# Patient Record
Sex: FEMALE | Race: OTHER | HISPANIC OR LATINO | Employment: FULL TIME | URBAN - METROPOLITAN AREA
[De-identification: names, ages, dates, MRNs, and addresses within clinical notes are randomized per-mention and may not be internally consistent; named-entity substitution may affect disease eponyms.]

---

## 2018-07-12 ENCOUNTER — HOSPITAL ENCOUNTER (EMERGENCY)
Facility: HOSPITAL | Age: 18
Discharge: HOME | End: 2018-07-12
Attending: EMERGENCY MEDICINE

## 2018-07-12 VITALS
HEART RATE: 72 BPM | WEIGHT: 99 LBS | SYSTOLIC BLOOD PRESSURE: 114 MMHG | DIASTOLIC BLOOD PRESSURE: 63 MMHG | OXYGEN SATURATION: 99 % | RESPIRATION RATE: 18 BRPM | TEMPERATURE: 98.3 F

## 2018-07-12 DIAGNOSIS — R55 SYNCOPE: Primary | ICD-10-CM

## 2018-07-12 LAB
ANION GAP BLD CALC-SCNC: 14 MMOL/L (ref 4–13)
ATRIAL RATE: 86 BPM
BACTERIA UR QL AUTO: ABNORMAL /HPF
BILIRUB UR QL STRIP: ABNORMAL
BUN BLD-MCNC: 14 MG/DL (ref 5–25)
CA-I BLD-SCNC: 1.19 MMOL/L (ref 1.12–1.32)
CHLORIDE BLD-SCNC: 103 MMOL/L (ref 100–108)
CLARITY UR: ABNORMAL
COARSE GRAN CASTS URNS QL MICRO: ABNORMAL /LPF
COLOR UR: YELLOW
CREAT BLD-MCNC: 0.5 MG/DL (ref 0.6–1.3)
EXT PREG TEST URINE: NORMAL
GFR SERPL CREATININE-BSD FRML MDRD: 140 ML/MIN/1.73SQ M
GLUCOSE SERPL-MCNC: 78 MG/DL (ref 65–140)
GLUCOSE SERPL-MCNC: 85 MG/DL (ref 65–140)
GLUCOSE UR STRIP-MCNC: NEGATIVE MG/DL
HCT VFR BLD CALC: 38 % (ref 34.8–46.1)
HGB BLDA-MCNC: 12.9 G/DL (ref 11.5–15.4)
HGB UR QL STRIP.AUTO: NEGATIVE
KETONES UR STRIP-MCNC: NEGATIVE MG/DL
LEUKOCYTE ESTERASE UR QL STRIP: NEGATIVE
MUCOUS THREADS UR QL AUTO: ABNORMAL
NITRITE UR QL STRIP: NEGATIVE
NON-SQ EPI CELLS URNS QL MICRO: ABNORMAL /HPF
P AXIS: 55 DEGREES
PCO2 BLD: 27 MMOL/L (ref 21–32)
PH UR STRIP.AUTO: 5.5 [PH] (ref 4.5–8)
POTASSIUM BLD-SCNC: 3.8 MMOL/L (ref 3.5–5.3)
PR INTERVAL: 146 MS
PROT UR STRIP-MCNC: >=300 MG/DL
QRS AXIS: 81 DEGREES
QRSD INTERVAL: 86 MS
QT INTERVAL: 342 MS
QTC INTERVAL: 409 MS
RBC #/AREA URNS AUTO: ABNORMAL /HPF
SODIUM BLD-SCNC: 140 MMOL/L (ref 136–145)
SP GR UR STRIP.AUTO: >=1.03 (ref 1–1.03)
SPECIMEN SOURCE: ABNORMAL
T WAVE AXIS: 53 DEGREES
UROBILINOGEN UR QL STRIP.AUTO: 1 E.U./DL
VENTRICULAR RATE: 86 BPM
WBC #/AREA URNS AUTO: ABNORMAL /HPF

## 2018-07-12 PROCEDURE — 93005 ELECTROCARDIOGRAM TRACING: CPT

## 2018-07-12 PROCEDURE — 81001 URINALYSIS AUTO W/SCOPE: CPT

## 2018-07-12 PROCEDURE — 99284 EMERGENCY DEPT VISIT MOD MDM: CPT

## 2018-07-12 PROCEDURE — 81025 URINE PREGNANCY TEST: CPT | Performed by: EMERGENCY MEDICINE

## 2018-07-12 PROCEDURE — 85014 HEMATOCRIT: CPT

## 2018-07-12 PROCEDURE — 93010 ELECTROCARDIOGRAM REPORT: CPT | Performed by: INTERNAL MEDICINE

## 2018-07-12 PROCEDURE — 80047 BASIC METABLC PNL IONIZED CA: CPT

## 2018-07-12 PROCEDURE — 82948 REAGENT STRIP/BLOOD GLUCOSE: CPT

## 2018-07-12 NOTE — DISCHARGE INSTRUCTIONS
Síncope   CUIDADO AMBULATORIO:   Síncope  también se conoce amanda desmayo o pérdida de la consciencia  El síncope es la pérdida repentina y temporal de la consciencia, seguida por melba caída estando en melba posición de pie o sentado  El síncope puede no tener gravedad o ser melba señal de melba afección más grave que debe tratarse  Usted puede controlar ciertas afecciones médicas que provocan síncope  Kinsey médicos pueden ayudarlo a crear un plan para controlar el síncope y evitar la ocurrencia de episodios  Los signos y síntomas que podrían ocurrir antes de un síncope [place] siguientes:   · Piel fría, húmeda y sudorosa     · Respiración acelerada y un ritmo cardíaco acelerado, peng     · Sentir más cansancio de lo usual     · Náuseas, melba sensación de calor y sudoración    · Dolor de [place] o sensación de desvanecimiento o mareos    · Sensación de hormigueo o adormecimiento     · Manchas julia de los ojos, visión borrosa o visión doble  Busque atención médica de inmediato si:   · Está sangrando porque se golpeó la edward cuando se desmayó  · Usted repentinamente tiene doble visión, dificultad para hablar, adormecimiento y no puede  kinsey brazos o piernas  · Usted tiene dolor en el pecho y dificultad para respirar  · Usted vomita lázaro o un material que parece café molido  · Usted ve lázaro en kinsey deposiciones  Pregúntele a duran Kathleen Dust vitaminas y minerales son adecuados para usted  · Usted tiene nuevos síntomas o kinsey síntomas empeoran  · Tiene otro episodio de síncope  · Tiene dolor de edward, ritmo cardíaco acelerado o se siente demasiado mareado para ponerse pie  · Usted tiene preguntas o inquietudes acerca de duran condición o cuidado  El tratamiento para el síncope  depende de la causa del síncope   Para prevenir que el síncope suceda otra vez, es probable  que usted necesite cualquiera de los siguientes:  · Medicamentos,  podrían necesitarse para tratar cualquier trastorno [employer] que le esté causando el síncope  Estos medicamentos podrían incluir medicamentos para ayudarle a duarn corazón a bombear más peng y regularmente  Duran médico podría hacer cambios a cualquier [facility] esté causando síncope  · El entrenamiento de inclinación  requiere que usted se entrene para ponerse de pie jefferson 10 a 30 minutos contra melba pared  [place] ayuda a que duran cuerpo [employer] de los cambios de [place] y reduce el número de [place]  Control del síncope:   · Lleve un registro de los episodios de síncope  Incluya los síntomas y la actividad que realiza antes y después del episodio  El registro puede ayudar a duran médico a determinar la causa del síncope y ayudarlo a usted a controlar los episodios  · Siéntese o acuéstese cuando sea necesario  [place] incluye cuando se sienta mareado, duran garganta se cierre o note cambios en duran visión  Eleve kinsey piernas por encima del nivel de duran corazón  · Inhale lenta y profundamente si comienza a respirar más rápido a causa de la ansiedad o el temor  [place] puede disminuir el [employer] y la sensación de que se va a desmayar  · Revise duran presión arterial con frecuencia  [place] es importante si usted regina medicamentos para bajar la presión arterial  Revise duran presión arterial cuando esté acostado y cuando esté de pie  Pregunte con que frecuencia debe tomarse la presión jefferson el día  Mantenga un registro de los valores numéricos de duran presión arterial  Duran médico puede usar la información del registro amanda melba base para planificar duran tratamiento  Prevención de un episodio de síncope:   · Muévase lentamente y acostúmbrese a melba posición antes de moverse a [place]  [place] es muy importante cuando usted se cambie de melba posición [place] o sentada a melba posición de pie  Respire profundo varias veces antes de ponerse de pie después de alfa [employer]  Póngase de pie lentamente  Los movimientos repentinos podrían causar [place]   [place]éntese en el borde de la cama o del sofá por unos minutos antes de ponerse de pie  En madeline que esté guardando cama, debe tratar de estar en melba posición vertical por lo menos por 2 horas todos los días o amanda se lo indicaron  No inmovilice las piernas cuando esté de pie jefferson un hcase periodo de [place]  Mueva las piernas y [address] las rodillas para mantener el flujo de Attila Chakraborty  · [id number] [facility],  duran médico   El médico puede  recomendarle que ingiera más líquidos para evitar la deshidratación  Es probable que usted también deba aumentar duran consumo de sal para evitar que duran presión arterial baje demasiado y [place] un síncope  El médico le dirá cuánto líquido y sodio debe consumir cada día  · Esté atento a los signos de bajo nivel de azúcar en la lázaro  Los signos incluyen [place], nerviosismo, sudoración y latidos cardíacos rápidos o palpitantes  Consulte con duran médico sobre métodos para mantener estable duran nivel de azúcar en la lázaro  · No se esfuerce si está estreñido  Puede desmayarse si usted hace fuerza para realizar melba evacuación intestinal  Caminar es lo mejor que usted puede hacer para que kinsey intestinos se [place]  Consuma alimentos ricos en fibra para facilitar las evacuaciones intestinales  Los [employer], los frijoles, las verduras y los panes integrales son Srini Colleen  El jugo de ciruelas pasas también puede suavizar las evacuaciones intestinales  · Tenga cuidado cuando hace calor  El calor puede causar un episodio de síncope  Limite la actividad que realiza al aire byron en días calurosos  La actividad física en días calurosos puede conducir a la deshidratación  [place] puede provocar un episodio  Acuda a kinsey consultas de control con duran médico según le indicaron  Anote kinsey preguntas para que se acuerde de hacerlas jefferson kinsey visitas     © 2017 [address] Information is for End User's use only and may not be sold, redistributed or otherwise used for commercial purposes  All illustrations and images included in CareNotes® are the copyrighted property of A D A M , Inc  or Louie Morales  Esta información es sólo para uso en educación  Duran intención no es darle un consejo médico sobre enfermedades o tratamientos  Colsulte con duran [employer] farmacéutico antes de seguir cualquier régimen médico para saber si es seguro y efectivo para usted

## 2018-07-12 NOTE — ED PROVIDER NOTES
History  Chief Complaint   Patient presents with    Syncope     Patient reports felt syncopal at work, attempted candy for hx  of hypogycemia  Patient reports candy did not help, patient felt nausous vomited x1 and passed out, unknown if she hit her head  Denies possibility of pregnancy, reports first day on job in [address] [place] [address], reports she did not eat breakfast        History provided by:  Patient   used: No    Syncope   Episode history:  Single  Most recent episode: Today  Timing:  Constant  Progression:  Resolved  Chronicity:  Recurrent  Witnessed: yes    Relieved by:  Eating  Worsened by:  Nothing  Ineffective treatments:  None tried  Associated symptoms: no chest pain, no confusion, no dizziness, no fever, no headaches, no nausea, no shortness of breath and no vomiting        None       Past Medical History:   Diagnosis Date    Hypoglycemia        History reviewed  No pertinent surgical history  History reviewed  No pertinent family history  I have reviewed and agree with the history as documented  Social History   Substance Use Topics    Smoking status: Never Smoker    Smokeless tobacco: Never Used    Alcohol use Yes      Comment: socially        Review of Systems   Constitutional: Negative for activity change, appetite change, fatigue, fever and unexpected weight change  HENT: Negative for rhinorrhea, sore throat and voice change  Eyes: Negative for pain and visual disturbance  Respiratory: Negative for cough, chest tightness and shortness of breath  Cardiovascular: Positive for syncope  Negative for chest pain  Gastrointestinal: Negative for abdominal pain, diarrhea, nausea and vomiting  Endocrine: Negative for polyphagia and polyuria  Genitourinary: Negative for difficulty urinating, dysuria, flank pain, frequency, menstrual problem, urgency, vaginal bleeding and vaginal discharge     Musculoskeletal: Negative for back pain, gait problem, neck pain and neck stiffness  Skin: Negative for color change and rash  Allergic/Immunologic: Negative for immunocompromised state  Neurological: Negative for dizziness, syncope, speech difficulty, light-headedness, numbness and headaches  Hematological: Does not bruise/bleed easily  Psychiatric/Behavioral: Negative for confusion and decreased concentration  Physical Exam  Physical Exam   Constitutional: She is oriented to person, place, and time  She appears well-developed and well-nourished  HENT:   Head: Normocephalic and atraumatic  Eyes: Conjunctivae and EOM are normal  Pupils are equal, round, and reactive to light  No scleral icterus  Neck: Normal range of motion  Neck supple  No tracheal deviation present  Cardiovascular: Normal rate and regular rhythm  Pulmonary/Chest: Effort normal and breath sounds normal  No respiratory distress  Abdominal: Soft  She exhibits no distension  There is no tenderness  There is no rebound and no guarding  Musculoskeletal: Normal range of motion  She exhibits no tenderness or deformity  Lymphadenopathy:     She has no cervical adenopathy  Neurological: She is alert and oriented to person, place, and time  No gross focal sensory or motor deficits   Skin: Skin is warm and dry  No rash noted  She is not diaphoretic  Psychiatric: She has a normal mood and affect  Vitals reviewed        Vital Signs  ED Triage Vitals [07/12/18 1059]   Temperature Pulse Respirations Blood Pressure SpO2   98 3 °F (36 8 °C) 88 16 108/73 100 %      Temp Source Heart Rate Source Patient Position - Orthostatic VS BP Location FiO2 (%)   Oral Monitor Sitting Right arm --      Pain Score       No Pain           Vitals:    07/12/18 1059 07/12/18 1256   BP: 108/73 114/63   Pulse: 88 72   Patient Position - Orthostatic VS: Sitting Lying       Visual Acuity      ED Medications  Medications - No data to display    Diagnostic Studies  Results Reviewed     Procedure Component Value Units Date/Time    POCT Chem 8+ [[id number]]  (Abnormal) Collected:  07/12/18 1209    Lab Status:  Final result Updated:  07/12/18 1213     SODIUM, I-STAT 140 mmol/l      Potassium, i-STAT 3 8 mmol/L      Chloride, istat 103 mmol/L      CO2, i-STAT 27 mmol/L      Anion Gap, Istat 14 (H) mmol/L      Calcium, Ionized i-STAT 1 19 mmol/L      BUN, I-STAT 14 mg/dl      Creatinine, i-STAT 0 5 (L) mg/dl      eGFR 140 ml/min/1 73sq m      Glucose, i-STAT 85 mg/dl      Hct, i-STAT 38 %      Hgb, i-STAT 12 9 g/dl      Specimen Type VENOUS    Urine Microscopic [[id number]]  (Abnormal) Collected:  07/12/18 1118    Lab Status:  Final result Specimen:  Urine from Urine, Clean Catch Updated:  07/12/18 1135     RBC, UA 2-4 (A) /hpf      WBC, UA 4-10 (A) /hpf      Epithelial Cells Innumerable (A) /hpf      Bacteria, UA Innumerable (A) /hpf      COARSE GRANULAR CASTS 5-10 /lpf      MUCOUS THREADS Moderate (A)    POCT urinalysis dipstick [[id number]]  (Abnormal) Resulted:  07/12/18 1114    Lab Status:  Final result Updated:  07/12/18 1114    POCT pregnancy, urine [[id number]]  (Normal) Resulted:  07/12/18 1114    Lab Status:  Final result Updated:  07/12/18 1114     EXT PREG TEST UR (Ref: Negative) HCG = neg (-)    ED Urine Macroscopic [[id number]]  (Abnormal) Collected:  07/12/18 1118    Lab Status:  Final result Specimen:  Urine Updated:  07/12/18 1113     Color, UA Yellow     Clarity, UA Cloudy     pH, UA 5 5     Leukocytes, UA Negative     Nitrite, UA Negative     Protein, UA >=300 (A) mg/dl      Glucose, UA Negative mg/dl      Ketones, UA Negative mg/dl      Urobilinogen, UA 1 0 E U /dl      Bilirubin, UA Interference- unable to analyze (A)     Blood, UA Negative     Specific Gravity, UA >=1 030    Narrative:       CLINITEK RESULT    Fingerstick Glucose (POCT) [[id number]]  (Normal) Collected:  07/12/18 1100    Lab Status:  Final result Updated:  07/12/18 1100     POC Glucose 78 mg/dl                  No orders to display Procedures  Procedures       Phone Contacts  ED Phone Contact    ED Course  ED Course as of Jul 12 1414   Thu Jul 12, 2018   1240 Reviewed Patient's EKG with Dr Romy Melendez of cardiology  We agree that it is not suggestive of WPW, Brugada, or HOCM  Suggests OP follow up with PCP/cardiology and possible OP ECHO  MDM  CritCare Time    Disposition  Final diagnoses:   Syncope     Time reflects when diagnosis was documented in both MDM as applicable and the Disposition within this note     Time User Action Codes Description Comment    7/12/2018 12:43 PM Anastasia De Los Santos Add [R55] Syncope       ED Disposition     ED Disposition Condition Comment    Discharge  [facility] discharge to home/self care  Condition at discharge: Good        Follow-up Information     Follow up With Specialties Details [facility]  Please call to arrange for follow-up with outpatient Cardiology  [address] [address] [phone]          There are no discharge medications for this patient  No discharge procedures on file      ED Provider  Electronically Signed by

## 2018-08-14 NOTE — ED PROVIDER NOTES
History  Chief Complaint   Patient presents with    Syncope     Patient reports felt syncopal at work, attempted candy for hx  of hypogycemia  Patient reports candy did not help, patient felt nausous vomited x1 and passed out, unknown if she hit her head  Denies possibility of pregnancy, reports first day on job in [address] [place] [address], reports she did not eat breakfast        History provided by:  Patient   used: No    Syncope   Episode history:  Single  Most recent episode: Today  Timing:  Constant  Progression:  Resolved  Chronicity:  Recurrent  Witnessed: yes    Relieved by:  Eating  Worsened by:  Nothing  Ineffective treatments:  None tried  Associated symptoms: no chest pain, no confusion, no dizziness, no fever, no headaches, no nausea, no shortness of breath and no vomiting        None       Past Medical History:   Diagnosis Date    Hypoglycemia        History reviewed  No pertinent surgical history  History reviewed  No pertinent family history  I have reviewed and agree with the history as documented  Social History   Substance Use Topics    Smoking status: Never Smoker    Smokeless tobacco: Never Used    Alcohol use Yes      Comment: socially        Review of Systems   Constitutional: Negative for activity change, appetite change, fatigue, fever and unexpected weight change  HENT: Negative for rhinorrhea, sore throat and voice change  Eyes: Negative for pain and visual disturbance  Respiratory: Negative for cough, chest tightness and shortness of breath  Cardiovascular: Positive for syncope  Negative for chest pain  Gastrointestinal: Negative for abdominal pain, diarrhea, nausea and vomiting  Endocrine: Negative for polyphagia and polyuria  Genitourinary: Negative for difficulty urinating, dysuria, flank pain, frequency, urgency, vaginal bleeding and vaginal discharge  Musculoskeletal: Negative for back pain, gait problem, neck pain and neck stiffness     Skin: Negative for color change and rash  Allergic/Immunologic: Negative for immunocompromised state  Neurological: Negative for dizziness, syncope, speech difficulty, light-headedness, numbness and headaches  Hematological: Does not bruise/bleed easily  Psychiatric/Behavioral: Negative for confusion and decreased concentration  Physical Exam  Physical Exam   Constitutional: She is oriented to person, place, and time  She appears well-developed and well-nourished  HENT:   Head: Normocephalic and atraumatic  Eyes: Conjunctivae and EOM are normal  Pupils are equal, round, and reactive to light  No scleral icterus  Neck: Normal range of motion  Neck supple  No tracheal deviation present  Cardiovascular: Normal rate and regular rhythm  Pulmonary/Chest: Effort normal and breath sounds normal  No respiratory distress  Abdominal: Soft  She exhibits no distension  There is no tenderness  Musculoskeletal: Normal range of motion  She exhibits no tenderness or deformity  Lymphadenopathy:     She has no cervical adenopathy  Neurological: She is alert and oriented to person, place, and time  Gcs 15, No gross focal sensory or motor deficits  Cranial nerves 2-12 are grossly intact  5/5 strength in all four extremities  Ambulates without difficulty or need for assistance  Skin: Skin is warm and dry  No rash noted  She is not diaphoretic  Psychiatric: She has a normal mood and affect  Vitals reviewed        Vital Signs  ED Triage Vitals [07/12/18 1059]   Temperature Pulse Respirations Blood Pressure SpO2   98 3 °F (36 8 °C) 88 16 108/73 100 %      Temp Source Heart Rate Source Patient Position - Orthostatic VS BP Location FiO2 (%)   Oral Monitor Sitting Right arm --      Pain Score       No Pain           Vitals:    07/12/18 1059 07/12/18 1256   BP: 108/73 114/63   Pulse: 88 72   Patient Position - Orthostatic VS: Sitting Lying       Visual Acuity      ED Medications  Medications - No data to display    Diagnostic Studies  Results Reviewed     Procedure Component Value Units Date/Time    POCT Chem 8+ [[id number]]  (Abnormal) Collected:  07/12/18 1209    Lab Status:  Final result Updated:  07/12/18 1213     SODIUM, I-STAT 140 mmol/l      Potassium, i-STAT 3 8 mmol/L      Chloride, istat 103 mmol/L      CO2, i-STAT 27 mmol/L      Anion Gap, Istat 14 (H) mmol/L      Calcium, Ionized i-STAT 1 19 mmol/L      BUN, I-STAT 14 mg/dl      Creatinine, i-STAT 0 5 (L) mg/dl      eGFR 140 ml/min/1 73sq m      Glucose, i-STAT 85 mg/dl      Hct, i-STAT 38 %      Hgb, i-STAT 12 9 g/dl      Specimen Type VENOUS    Urine Microscopic [[id number]]  (Abnormal) Collected:  07/12/18 1118    Lab Status:  Final result Specimen:  Urine from Urine, Clean Catch Updated:  07/12/18 1135     RBC, UA 2-4 (A) /hpf      WBC, UA 4-10 (A) /hpf      Epithelial Cells Innumerable (A) /hpf      Bacteria, UA Innumerable (A) /hpf      COARSE GRANULAR CASTS 5-10 /lpf      MUCOUS THREADS Moderate (A)    POCT urinalysis dipstick [[id number]]  (Abnormal) Resulted:  07/12/18 1114    Lab Status:  Final result Updated:  07/12/18 1114    POCT pregnancy, urine [[id number]]  (Normal) Resulted:  07/12/18 1114    Lab Status:  Final result Updated:  07/12/18 1114     EXT PREG TEST UR (Ref: Negative) HCG = neg (-)    ED Urine Macroscopic [[id number]]  (Abnormal) Collected:  07/12/18 1118    Lab Status:  Final result Specimen:  Urine Updated:  07/12/18 1113     Color, UA Yellow     Clarity, UA Cloudy     pH, UA 5 5     Leukocytes, UA Negative     Nitrite, UA Negative     Protein, UA >=300 (A) mg/dl      Glucose, UA Negative mg/dl      Ketones, UA Negative mg/dl      Urobilinogen, UA 1 0 E U /dl      Bilirubin, UA Interference- unable to analyze (A)     Blood, UA Negative     Specific Gravity, UA >=1 030    Narrative:       CLINITEK RESULT    Fingerstick Glucose (POCT) [[id number]]  (Normal) Collected:  07/12/18 1100    Lab Status:  Final result Updated:  07/12/18 1100 POC Glucose 78 mg/dl                  No orders to display              Procedures  ECG 12 Lead Documentation  Date/Time: 7/12/2018 12:45 PM  Performed by: Madison Cortes  Authorized by: Madison Cortes     ECG reviewed by me, the ED Provider: yes    Patient location:  ED  Previous ECG:     Previous ECG:  Unavailable  Rate:     ECG rate assessment: normal    Rhythm:     Rhythm: sinus rhythm    Ectopy:     Ectopy: none    Conduction:     Conduction normal: RSR' in v1     ST segments:     ST segments:  Normal  T waves:     T waves: normal    Other findings:     Other findings: early repolarization             Phone Contacts  ED Phone Contact    ED Course  ED Course as of Aug 14 0842   u Jul 12, 2018   1240 Reviewed Patient's EKG with Dr Taisha Estrada of cardiology  We agree that it is not suggestive of WPW, Brugada, or HOCM  Suggests OP follow up with PCP/cardiology and possible OP ECHO  MDM  Number of Diagnoses or Management Options  Syncope: new and requires workup  Diagnosis management comments: 58-year-old female presented for evaluation of syncope  Patient had a single syncopal episode while at work today  Patient was standing and sorting some packaging  Denies any strenuous activity  She had a sensation of lightheadedness and then describes an episode of brief on consciousness and loss of postural tone  Denies any preceding chest pain or chest pain/shortness of breath afterwards  Was observed by coworkers  Patient did not strike her head  She states that she has had multiple similar episodes in the past "when my blood sugar gets low "  Patient states that she ate some fruit and felt much better  Blood sugar any EMTs arrival was normal  Patient currently denies any symptoms  She has not had any recent fevers  States normal eating, no nausea or vomiting  No urinary symptoms   Patient's EKG in the emergency department did demonstrate RSR prime in V1 and early repolarization variant  I discussed the patient's case with Dr Gretel Barone of cardiology, who reviewed the pt's EKG  Observed in ED greater than 1 hr and no dysrhythmias noted on telemetry  No family history of early cardiac disease or sudden cardiac death  At this point will DC to follow up with PCP/Cardiology and possible outpatient echo  Discussed return precautions  Patient is agreement with this plan  Amount and/or Complexity of Data Reviewed  Clinical lab tests: reviewed and ordered  Review and summarize past medical records: yes  Discuss the patient with other providers: yes  Independent visualization of images, tracings, or specimens: yes      CritCare Time    Disposition  Final diagnoses:   Syncope     Time reflects when diagnosis was documented in both MDM as applicable and the Disposition within this note     Time User Action Codes Description Comment    7/12/2018 12:43 PM Parth Paula Add [R55] Syncope       ED Disposition     ED Disposition Condition Comment    Discharge  [facility] discharge to home/self care  Condition at discharge: Good        Follow-up Information     Follow up With Specialties Details [facility]  Please call to arrange for follow-up with outpatient Cardiology  [address] [address] [phone]          There are no discharge medications for this patient  No discharge procedures on file      ED Provider  Electronically Signed by           David Lyman MD  08/14/18 MD Jett  08/14/18 [id number]

## 2020-12-14 ENCOUNTER — OFFICE VISIT (OUTPATIENT)
Dept: URGENT CARE | Age: 20
End: 2020-12-14
Payer: COMMERCIAL

## 2020-12-14 VITALS
SYSTOLIC BLOOD PRESSURE: 113 MMHG | RESPIRATION RATE: 20 BRPM | HEART RATE: 93 BPM | WEIGHT: 101.4 LBS | BODY MASS INDEX: 18.66 KG/M2 | HEIGHT: 62 IN | TEMPERATURE: 98.5 F | OXYGEN SATURATION: 99 % | DIASTOLIC BLOOD PRESSURE: 67 MMHG

## 2020-12-14 DIAGNOSIS — R42 LIGHTHEADEDNESS: Primary | ICD-10-CM

## 2020-12-14 LAB — SL AMB POCT GLUCOSE BLD: 75

## 2020-12-14 PROCEDURE — 99213 OFFICE O/P EST LOW 20 MIN: CPT | Performed by: PHYSICIAN ASSISTANT

## 2020-12-14 PROCEDURE — 93005 ELECTROCARDIOGRAM TRACING: CPT | Performed by: PHYSICIAN ASSISTANT

## 2020-12-15 LAB
ATRIAL RATE: 85 BPM
P AXIS: 17 DEGREES
PR INTERVAL: 142 MS
QRS AXIS: 79 DEGREES
QRSD INTERVAL: 78 MS
QT INTERVAL: 364 MS
QTC INTERVAL: 433 MS
T WAVE AXIS: 54 DEGREES
VENTRICULAR RATE: 85 BPM

## 2020-12-15 PROCEDURE — 93010 ELECTROCARDIOGRAM REPORT: CPT | Performed by: INTERNAL MEDICINE

## 2021-03-24 ENCOUNTER — OFFICE VISIT (OUTPATIENT)
Dept: URGENT CARE | Age: 21
End: 2021-03-24
Payer: COMMERCIAL

## 2021-03-24 VITALS
HEART RATE: 98 BPM | BODY MASS INDEX: 18.4 KG/M2 | RESPIRATION RATE: 16 BRPM | HEIGHT: 62 IN | WEIGHT: 100 LBS | TEMPERATURE: 98.8 F | DIASTOLIC BLOOD PRESSURE: 73 MMHG | OXYGEN SATURATION: 100 % | SYSTOLIC BLOOD PRESSURE: 111 MMHG

## 2021-03-24 DIAGNOSIS — Z34.90 PREGNANCY, UNSPECIFIED GESTATIONAL AGE: ICD-10-CM

## 2021-03-24 DIAGNOSIS — R55 SYNCOPE, UNSPECIFIED SYNCOPE TYPE: ICD-10-CM

## 2021-03-24 DIAGNOSIS — N92.6 MISSED PERIOD: ICD-10-CM

## 2021-03-24 DIAGNOSIS — R42 DIZZINESS: Primary | ICD-10-CM

## 2021-03-24 LAB
GLUCOSE SERPL-MCNC: 91 MG/DL (ref 65–140)
SL AMB POCT GLUCOSE BLD: 91
SL AMB POCT URINE HCG: POSITIVE

## 2021-03-24 PROCEDURE — 82948 REAGENT STRIP/BLOOD GLUCOSE: CPT | Performed by: NURSE PRACTITIONER

## 2021-03-24 PROCEDURE — 99213 OFFICE O/P EST LOW 20 MIN: CPT | Performed by: NURSE PRACTITIONER

## 2021-03-24 PROCEDURE — 81025 URINE PREGNANCY TEST: CPT | Performed by: NURSE PRACTITIONER

## 2021-03-24 NOTE — PROGRESS NOTES
NAME: Karen Dobbs is a 19 y o  female  : 2000    MRN: [id number]    /73   Pulse 98   Temp 98 8 °F (37 1 °C)   Resp 16   Ht 5' 2" (1 575 m)   Wt 45 4 kg (100 lb)   LMP 2021 (Exact Date)   SpO2 100%   BMI 18 29 kg/m²     Assessment and Plan   Dizziness [R42]  1  Dizziness  POCT urine HCG    POCT blood glucose    Ambulatory referral to Obstetrics / Gynecology   2  Missed period  POCT blood glucose    Ambulatory referral to Obstetrics / Gynecology   3  Syncope, unspecified syncope type  POCT blood glucose    Ambulatory referral to Obstetrics / Gynecology   4  Pregnancy, unspecified gestational age  Ambulatory referral to Obstetrics / Gynecology       Conception Shameka was seen today for dizziness  Diagnoses and all orders for this visit:    Dizziness  -     POCT urine HCG  -     POCT blood glucose  -     Ambulatory referral to Obstetrics / Gynecology; Future    Missed period  -     POCT blood glucose  -     Ambulatory referral to Obstetrics / Gynecology; Future    Syncope, unspecified syncope type  -     POCT blood glucose  -     Ambulatory referral to Obstetrics / Gynecology; Future    Pregnancy, unspecified gestational age  -     Ambulatory referral to Obstetrics / Gynecology; Future    Other orders  -     Fingerstick Glucose (POCT)        Patient Instructions   Patient Instructions     Follow up with pcp/ ob/gyn  Aware that today she is pregnant and her blood sugar is 91 mg/dl today  She has no CP, SOB, HA or dizziness at this time  Pt aware to go to the ER,   Ob/gyn referral placed in today      Pregnancy   WHAT YOU NEED TO KNOW:   A normal pregnancy lasts about 40 weeks  The first trimester lasts from your last period through the 12th week of pregnancy  The second trimester lasts from the 13th week through the 23rd week  The third trimester lasts from the 24th week until your baby is born   If you know the date of your last period, your healthcare provider can estimate your due date  You may give birth to your baby any time from 37 weeks to 2 weeks after your due date  DISCHARGE INSTRUCTIONS:   Return to the emergency department if:   · You develop a severe headache that does not go away  · You have new or increased vision changes, such as blurred or spotted vision  · You have new or increased swelling in your face or hands  · You have pain or cramping in your abdomen or low back  · You have vaginal bleeding  Call your doctor or obstetrician if:   · You have abdominal cramps, pressure, or tightening  · You have a change in vaginal discharge  · You cannot keep food or drinks down, and you are losing weight  · You have chills or a fever  · You have vaginal itching, burning, or pain  · You have yellow, green, white, or foul-smelling vaginal discharge  · You have pain or burning when you urinate, less urine than usual, or pink or bloody urine  · You have questions or concerns about your condition or care  Medicines:   · Prenatal vitamins  provide some of the extra vitamins and minerals you need during pregnancy  Prenatal vitamins may also help to decrease the risk for certain birth defects  · Take your medicine as directed  Contact your healthcare provider if you think your medicine is not helping or if you have side effects  Tell him or her if you are allergic to any medicine  Keep a list of the medicines, vitamins, and herbs you take  Include the amounts, and when and why you take them  Bring the list or the pill bottles to follow-up visits  Carry your medicine list with you in case of an emergency  Follow up with your doctor or obstetrician as directed:  Go to all of your prenatal visits during your pregnancy  Write down your questions so you remember to ask them during your visits  Prenatal care:  Prenatal care is a series of visits with your healthcare provider throughout your pregnancy   Prenatal care can help prevent problems during pregnancy and childbirth  At each prenatal visit, your provider will weigh you and check your blood pressure  He or she will also check your baby's heartbeat and growth  You may also need the following at some visits:  · A pelvic exam  allows your healthcare provider to see your cervix (the bottom part of your uterus)  Your healthcare provider will use a speculum to open your vagina  He or she will check the size and shape of your uterus  At your first prenatal visit, you may also have a Pap smear  This is a test to check your cervix for abnormal cells  · Blood tests  may be done to check for any of the following:     ? Gestational diabetes or anemia (low iron level)    ? Blood type or Rh factor, or certain birth defects    ? Immunity to certain diseases, such as chickenpox or rubella    ? An infection, such as a sexually transmitted infection, HIV, or hepatitis B    · Hepatitis B  may need to be prevented or treated  Hepatitis B is inflammation of the liver caused by the hepatitis B virus (HBV)  HBV can spread from a mother to her baby during delivery  You will be checked for HBV as early as possible in the first trimester of each pregnancy  You need the test even if you received the hepatitis B vaccine or were tested before  You may need to have an HBV infection treated before you give birth  · Urine tests  may also be done to check for sugar and protein  These can be signs of gestational diabetes or preeclampsia  Urine tests may also be done to check for signs of infection  · A fetal ultrasound  shows pictures of your baby inside your uterus  The pictures are used to check your baby's development, movement, and position  · Genetic disorder screening tests  may be offered to you  These tests check your baby's risk for genetic disorders such as Down syndrome  A screening test includes a blood test and ultrasound      Body changes that may occur during your pregnancy:   · Breast changes  you will experience include tenderness and tingling during the early part of your pregnancy  Your breasts will become larger  You may need to use a support bra  You may see a thin, yellow fluid, called colostrum, leak from your nipples during the second trimester  Colostrum is a liquid that changes to milk about 3 days after you give birth  · Skin changes and stretch marks  may occur during your pregnancy  You may have red marks, called stretch marks, on your skin  Stretch marks will usually fade after pregnancy  Use lotion if your skin is dry and itchy  The skin on your face, around your nipples, and below your belly button may darken  Most of the time, your skin will return to its normal color after your baby is born  · Morning sickness  is nausea and vomiting that can happen at any time of day  Avoid fatty and spicy foods  Eat small meals throughout the day instead of large meals  Debo may help to decrease nausea  Ask your healthcare provider about other ways of decreasing nausea and vomiting  · Heartburn  may be caused by changes in your hormones during pregnancy  Your growing uterus may also push your stomach upward and force stomach acid to back up into your esophagus  Eat 4 or 5 small meals each day instead of large meals  Avoid spicy foods  Avoid eating right before bedtime  · Constipation  may develop during your pregnancy  To treat constipation, eat foods high in fiber such as fiber cereals, beans, fruits, vegetables, whole-grain breads, and prune juice  Get regular exercise and drink plenty of water  Your healthcare provider may also suggest a fiber supplement to soften your bowel movements  Talk to your healthcare provider before you use any medicines to decrease constipation  · Hemorrhoids  are enlarged veins in the rectal area  They may cause pain, itching, and bright red bleeding from your rectum   To decrease your risk for hemorrhoids, prevent constipation and do not strain to have a bowel movement  If you have hemorrhoids, soak in a tub of warm water to ease discomfort  Ask your healthcare provider how you can treat hemorrhoids  · Leg cramps and swelling  may be caused by low calcium levels or the added weight of pregnancy  Raise your legs above the level of your heart to decrease swelling  During a leg cramp, stretch or massage the muscle that has the cramp  Heat may help decrease pain and muscle spasms  Apply heat on your muscle for 20 to 30 minutes every 2 hours for as many days as directed  · Back pain  may occur as your baby grows  Do not stand for long periods of time or lift heavy items  Use good posture while you stand, squat, or bend  Wear low-heeled shoes with good support  Rest may also help to relieve back pain  Ask your healthcare provider about exercises you can do to strengthen your back muscles  Stay healthy during your pregnancy:   · Eat a variety of healthy foods  Healthy foods include fruits, vegetables, whole-grain breads, low-fat dairy foods, beans, lean meats, and fish  Drink liquids as directed  Ask how much liquid to drink each day and which liquids are best for you  Limit caffeine to less than 200 milligrams each day  Limit your intake of fish to 2 servings each week  Choose fish low in mercury such as canned light tuna, shrimp, crab, salmon, cod, or tilapia  Do not  eat fish high in mercury such as swordfish, tilefish, lucretia mackerel, and shark  · Take prenatal vitamins as directed  Your need for certain vitamins and minerals, such as folic acid, increases during pregnancy  Prenatal vitamins provide some of the extra vitamins and minerals you need  Prenatal vitamins may also help to decrease the risk for certain birth defects  · Ask how much weight you should gain during your pregnancy  Too much or too little weight gain can be unhealthy for you and your baby  · Talk to your healthcare provider about exercise    Moderate exercise can help you stay fit  Your healthcare provider will help you plan an exercise program that is safe for you during pregnancy  · Do not smoke  Smoking increases your risk for a miscarriage and heart and blood vessel problems  Smoking can cause your baby to be born too early or weigh less at birth  Quit smoking as soon as you think you might be pregnant  Ask your healthcare provider for information if you need help quitting  · Do not drink alcohol  Alcohol passes from your body to your baby through the placenta  It can affect your baby's brain development and cause fetal alcohol syndrome (FAS)  FAS is a group of conditions that causes mental, behavior, and growth problems  · Talk to your healthcare provider before you take any medicines  Many medicines may harm your baby if you take them when you are pregnant  Do not take any medicines, vitamins, herbs, or supplements without first talking to your healthcare provider  Never use illegal or street drugs (such as marijuana or cocaine) while you are pregnant  Safety tips:   · Avoid hot tubs and saunas  Do not use a hot tub or sauna while you are pregnant, especially during your first trimester  Hot tubs and saunas may raise your baby's temperature and increase the risk for birth defects  · Avoid toxoplasmosis  This is an infection caused by eating raw meat or being around infected cat feces  It can cause birth defects, miscarriages, and other problems  Wash your hands after you touch raw meat  Make sure any meat is well-cooked before you eat it  Avoid raw eggs and unpasteurized milk  Use gloves or ask someone else to clean your cat's litter box while you are pregnant  · Ask your healthcare provider about travel  The most comfortable time to travel is during the second trimester  Ask your healthcare provider if you can travel after 36 weeks  You may not be able to travel in an airplane after 36 weeks  He may also recommend that you avoid long road trips      © Copyright [address] Information is for [employer] use only and may not be sold, redistributed or otherwise used for commercial purposes  All illustrations and images included in CareNotes® are the copyrighted property of [employer]  or Kirsten Gracia  The above information is an  only  It is not intended as medical advice for individual conditions or treatments  Talk to your doctor, nurse or pharmacist before following any medical regimen to see if it is safe and effective for you  Proceed to the nearest ER if symptoms worsen, Follow up with your PCP  Continue to social distance, wash your hands, and wear your masks  Please continue to follow the CDC  gov guidelines daily for they are subject to change on COVID-19    Chief Complaint     Chief Complaint   Patient presents with    Dizziness     pt reports dizziness that started a week ago  +nausea   Pt states she fainted today  Pt denies injuries         History of Present Illness       57-year-old female here today after fainting at work at the right a around noon time  She states she did not fall to the ground she did not hit her head she felt very woozy ate something and was better  She has a history of hypoglycemia did not take her sugar at that time and is not a known diabetic  However patient has also been very nauseous and she has missed her period  Urine dip will be done today to r/o pregnancy  Pt states feels better now but was told to come in and be checked  Patient denies hitting her head and was not aware that she was pregnant  Dizziness has subsided  Denies any other symptoms, denies abdominal pain, n/v/d      Review of Systems   Review of Systems   Constitutional: Negative for fatigue and fever  Respiratory: Negative  Cardiovascular: Negative  Gastrointestinal: Positive for nausea  Negative for abdominal pain     Genitourinary: Negative for difficulty urinating, dyspareunia, dysuria, hematuria, menstrual problem, pelvic pain, urgency, vaginal bleeding, vaginal discharge and vaginal pain  Neurological: Positive for dizziness (  Not at time of exam) and headaches  Current Medications     No current outpatient medications on file  Current Allergies     Allergies as of 03/24/2021    (No Known Allergies)              Past Medical History:   Diagnosis Date    Hypoglycemia        History reviewed  No pertinent surgical history  Family History   Problem Relation Age of Onset    Diabetes Maternal Grandfather          Medications have been verified  The following portions of the patient's history were reviewed and updated as appropriate: allergies, current medications, past family history, past medical history, past social history, past surgical history and problem list     Objective   /73   Pulse 98   Temp 98 8 °F (37 1 °C)   Resp 16   Ht 5' 2" (1 575 m)   Wt 45 4 kg (100 lb)   LMP 02/18/2021 (Exact Date)   SpO2 100%   BMI 18 29 kg/m²      Physical Exam     Physical Exam  Constitutional:       Appearance: Normal appearance  HENT:      Head: Normocephalic  Nose: Nose normal       Mouth/Throat:      Mouth: Mucous membranes are moist    Eyes:      Pupils: Pupils are equal, round, and reactive to light  Cardiovascular:      Rate and Rhythm: Normal rate  Pulses: Normal pulses  Pulmonary:      Effort: Pulmonary effort is normal       Breath sounds: Normal breath sounds  Abdominal:      General: Bowel sounds are normal  There is no distension  Palpations: Abdomen is soft  Musculoskeletal: Normal range of motion  Skin:     General: Skin is warm  Capillary Refill: Capillary refill takes less than 2 seconds  Neurological:      General: No focal deficit present  Mental Status: She is alert and oriented to person, place, and time  Mental status is at baseline  Sensory: Sensation is intact     Psychiatric:         Mood and Affect: Mood normal  Behavior: Behavior is cooperative  Note: Portions of this record may have been created with voice recognition software  Occasional wrong word or "sound a like" substitutions may have occurred due to the inherent limitations of voice recognition software  Please read the chart carefully and recognize, using context, where substitutions have occurred  WAYLON Correia

## 2021-03-24 NOTE — PATIENT INSTRUCTIONS
Follow up with pcp/ ob/gyn  Aware that today she is pregnant and her blood sugar is 91 mg/dl today  She has no CP, SOB, HA or dizziness at this time  Pt aware to go to the ER,   Ob/gyn referral placed in today      Pregnancy   WHAT YOU NEED TO KNOW:   A normal pregnancy lasts about 40 weeks  The first trimester lasts from your last period through the 12th week of pregnancy  The second trimester lasts from the 13th week through the 23rd week  The third trimester lasts from the 24th week until your baby is born  If you know the date of your last period, your healthcare provider can estimate your due date  You may give birth to your baby any time from 37 weeks to 2 weeks after your due date  DISCHARGE INSTRUCTIONS:   Return to the emergency department if:   · You develop a severe headache that does not go away  · You have new or increased vision changes, such as blurred or spotted vision  · You have new or increased swelling in your face or hands  · You have pain or cramping in your abdomen or low back  · You have vaginal bleeding  Call your doctor or obstetrician if:   · You have abdominal cramps, pressure, or tightening  · You have a change in vaginal discharge  · You cannot keep food or drinks down, and you are losing weight  · You have chills or a fever  · You have vaginal itching, burning, or pain  · You have yellow, green, white, or foul-smelling vaginal discharge  · You have pain or burning when you urinate, less urine than usual, or pink or bloody urine  · You have questions or concerns about your condition or care  Medicines:   · Prenatal vitamins  provide some of the extra vitamins and minerals you need during pregnancy  Prenatal vitamins may also help to decrease the risk for certain birth defects  · Take your medicine as directed  Contact your healthcare provider if you think your medicine is not helping or if you have side effects   Tell him or her if you are allergic to any medicine  Keep a list of the medicines, vitamins, and herbs you take  Include the amounts, and when and why you take them  Bring the list or the pill bottles to follow-up visits  Carry your medicine list with you in case of an emergency  Follow up with your doctor or obstetrician as directed:  Go to all of your prenatal visits during your pregnancy  Write down your questions so you remember to ask them during your visits  Prenatal care:  Prenatal care is a series of visits with your healthcare provider throughout your pregnancy  Prenatal care can help prevent problems during pregnancy and childbirth  At each prenatal visit, your provider will weigh you and check your blood pressure  He or she will also check your baby's heartbeat and growth  You may also need the following at some visits:  · A pelvic exam  allows your healthcare provider to see your cervix (the bottom part of your uterus)  Your healthcare provider will use a speculum to open your vagina  He or she will check the size and shape of your uterus  At your first prenatal visit, you may also have a Pap smear  This is a test to check your cervix for abnormal cells  · Blood tests  may be done to check for any of the following:     ? Gestational diabetes or anemia (low iron level)    ? Blood type or Rh factor, or certain birth defects    ? Immunity to certain diseases, such as chickenpox or rubella    ? An infection, such as a sexually transmitted infection, HIV, or hepatitis B    · Hepatitis B  may need to be prevented or treated  Hepatitis B is inflammation of the liver caused by the hepatitis B virus (HBV)  HBV can spread from a mother to her baby during delivery  You will be checked for HBV as early as possible in the first trimester of each pregnancy  You need the test even if you received the hepatitis B vaccine or were tested before  You may need to have an HBV infection treated before you give birth      · Urine tests  may also be done to check for sugar and protein  These can be signs of gestational diabetes or preeclampsia  Urine tests may also be done to check for signs of infection  · A fetal ultrasound  shows pictures of your baby inside your uterus  The pictures are used to check your baby's development, movement, and position  · Genetic disorder screening tests  may be offered to you  These tests check your baby's risk for genetic disorders such as Down syndrome  A screening test includes a blood test and ultrasound  Body changes that may occur during your pregnancy:   · Breast changes  you will experience include tenderness and tingling during the early part of your pregnancy  Your breasts will become larger  You may need to use a support bra  You may see a thin, yellow fluid, called colostrum, leak from your nipples during the second trimester  Colostrum is a liquid that changes to milk about 3 days after you give birth  · Skin changes and stretch marks  may occur during your pregnancy  You may have red marks, called stretch marks, on your skin  Stretch marks will usually fade after pregnancy  Use lotion if your skin is dry and itchy  The skin on your face, around your nipples, and below your belly button may darken  Most of the time, your skin will return to its normal color after your baby is born  · Morning sickness  is nausea and vomiting that can happen at any time of day  Avoid fatty and spicy foods  Eat small meals throughout the day instead of large meals  Debo may help to decrease nausea  Ask your healthcare provider about other ways of decreasing nausea and vomiting  · Heartburn  may be caused by changes in your hormones during pregnancy  Your growing uterus may also push your stomach upward and force stomach acid to back up into your esophagus  Eat 4 or 5 small meals each day instead of large meals  Avoid spicy foods  Avoid eating right before bedtime      · Constipation  may develop during your pregnancy  To treat constipation, eat foods high in fiber such as fiber cereals, beans, fruits, vegetables, whole-grain breads, and prune juice  Get regular exercise and drink plenty of water  Your healthcare provider may also suggest a fiber supplement to soften your bowel movements  Talk to your healthcare provider before you use any medicines to decrease constipation  · Hemorrhoids  are enlarged veins in the rectal area  They may cause pain, itching, and bright red bleeding from your rectum  To decrease your risk for hemorrhoids, prevent constipation and do not strain to have a bowel movement  If you have hemorrhoids, soak in a tub of warm water to ease discomfort  Ask your healthcare provider how you can treat hemorrhoids  · Leg cramps and swelling  may be caused by low calcium levels or the added weight of pregnancy  Raise your legs above the level of your heart to decrease swelling  During a leg cramp, stretch or massage the muscle that has the cramp  Heat may help decrease pain and muscle spasms  Apply heat on your muscle for 20 to 30 minutes every 2 hours for as many days as directed  · Back pain  may occur as your baby grows  Do not stand for long periods of time or lift heavy items  Use good posture while you stand, squat, or bend  Wear low-heeled shoes with good support  Rest may also help to relieve back pain  Ask your healthcare provider about exercises you can do to strengthen your back muscles  Stay healthy during your pregnancy:   · Eat a variety of healthy foods  Healthy foods include fruits, vegetables, whole-grain breads, low-fat dairy foods, beans, lean meats, and fish  Drink liquids as directed  Ask how much liquid to drink each day and which liquids are best for you  Limit caffeine to less than 200 milligrams each day  Limit your intake of fish to 2 servings each week  Choose fish low in mercury such as canned light tuna, shrimp, crab, salmon, cod, or tilapia   Do not  eat fish high in mercury such as swordfish, tilefish, lucretia mackerel, and shark  · Take prenatal vitamins as directed  Your need for certain vitamins and minerals, such as folic acid, increases during pregnancy  Prenatal vitamins provide some of the extra vitamins and minerals you need  Prenatal vitamins may also help to decrease the risk for certain birth defects  · Ask how much weight you should gain during your pregnancy  Too much or too little weight gain can be unhealthy for you and your baby  · Talk to your healthcare provider about exercise  Moderate exercise can help you stay fit  Your healthcare provider will help you plan an exercise program that is safe for you during pregnancy  · Do not smoke  Smoking increases your risk for a miscarriage and heart and blood vessel problems  Smoking can cause your baby to be born too early or weigh less at birth  Quit smoking as soon as you think you might be pregnant  Ask your healthcare provider for information if you need help quitting  · Do not drink alcohol  Alcohol passes from your body to your baby through the placenta  It can affect your baby's brain development and cause fetal alcohol syndrome (FAS)  FAS is a group of conditions that causes mental, behavior, and growth problems  · Talk to your healthcare provider before you take any medicines  Many medicines may harm your baby if you take them when you are pregnant  Do not take any medicines, vitamins, herbs, or supplements without first talking to your healthcare provider  Never use illegal or street drugs (such as marijuana or cocaine) while you are pregnant  Safety tips:   · Avoid hot tubs and saunas  Do not use a hot tub or sauna while you are pregnant, especially during your first trimester  Hot tubs and saunas may raise your baby's temperature and increase the risk for birth defects  · Avoid toxoplasmosis    This is an infection caused by eating raw meat or being around infected cat feces  It can cause birth defects, miscarriages, and other problems  Wash your hands after you touch raw meat  Make sure any meat is well-cooked before you eat it  Avoid raw eggs and unpasteurized milk  Use gloves or ask someone else to clean your cat's litter box while you are pregnant  · Ask your healthcare provider about travel  The most comfortable time to travel is during the second trimester  Ask your healthcare provider if you can travel after 36 weeks  You may not be able to travel in an airplane after 36 weeks  He may also recommend that you avoid long road trips  © Copyright [address] Information is for End User's use only and may not be sold, redistributed or otherwise used for commercial purposes  All illustrations and images included in CareNotes® are the copyrighted property of A D A M , Inc  or Kirsten Gracia  The above information is an  only  It is not intended as medical advice for individual conditions or treatments  Talk to your doctor, nurse or pharmacist before following any medical regimen to see if it is safe and effective for you

## 2021-03-29 ENCOUNTER — APPOINTMENT (EMERGENCY)
Dept: ULTRASOUND IMAGING | Facility: HOSPITAL | Age: 21
End: 2021-03-29
Payer: COMMERCIAL

## 2021-03-29 ENCOUNTER — HOSPITAL ENCOUNTER (EMERGENCY)
Facility: HOSPITAL | Age: 21
Discharge: HOME | End: 2021-03-29
Attending: EMERGENCY MEDICINE | Admitting: EMERGENCY MEDICINE
Payer: COMMERCIAL

## 2021-03-29 VITALS
WEIGHT: 99.43 LBS | RESPIRATION RATE: 16 BRPM | OXYGEN SATURATION: 100 % | HEART RATE: 63 BPM | DIASTOLIC BLOOD PRESSURE: 54 MMHG | SYSTOLIC BLOOD PRESSURE: 107 MMHG | TEMPERATURE: 98.4 F | BODY MASS INDEX: 18.19 KG/M2

## 2021-03-29 DIAGNOSIS — N93.9 VAGINAL BLEEDING: Primary | ICD-10-CM

## 2021-03-29 LAB
ABO GROUP BLD: NORMAL
ANION GAP SERPL CALCULATED.3IONS-SCNC: 11 MMOL/L (ref 4–13)
ATRIAL RATE: 73 BPM
B-HCG SERPL-ACNC: 5.1 MIU/ML
BACTERIA UR QL AUTO: ABNORMAL /HPF
BASOPHILS # BLD AUTO: 0.02 THOUSANDS/ΜL (ref 0–0.1)
BASOPHILS NFR BLD AUTO: 0 % (ref 0–1)
BILIRUB UR QL STRIP: NEGATIVE
BLD GP AB SCN SERPL QL: NEGATIVE
BUN SERPL-MCNC: 12 MG/DL (ref 5–25)
CALCIUM SERPL-MCNC: 9.2 MG/DL (ref 8.3–10.1)
CHLORIDE SERPL-SCNC: 105 MMOL/L (ref 100–108)
CLARITY UR: CLEAR
CO2 SERPL-SCNC: 25 MMOL/L (ref 21–32)
COLOR UR: YELLOW
CREAT SERPL-MCNC: 0.55 MG/DL (ref 0.6–1.3)
EOSINOPHIL # BLD AUTO: 0.04 THOUSAND/ΜL (ref 0–0.61)
EOSINOPHIL NFR BLD AUTO: 0 % (ref 0–6)
ERYTHROCYTE [DISTWIDTH] IN BLOOD BY AUTOMATED COUNT: 11.9 % (ref 11.6–15.1)
EXT PREG TEST URINE: NEGATIVE
EXT. CONTROL ED NAV: NORMAL
GFR SERPL CREATININE-BSD FRML MDRD: 133 ML/MIN/1.73SQ M
GLUCOSE SERPL-MCNC: 87 MG/DL (ref 65–140)
GLUCOSE UR STRIP-MCNC: NEGATIVE MG/DL
HCT VFR BLD AUTO: 40.4 % (ref 34.8–46.1)
HGB BLD-MCNC: 13.6 G/DL (ref 11.5–15.4)
HGB UR QL STRIP.AUTO: ABNORMAL
IMM GRANULOCYTES # BLD AUTO: 0.07 THOUSAND/UL (ref 0–0.2)
IMM GRANULOCYTES NFR BLD AUTO: 1 % (ref 0–2)
KETONES UR STRIP-MCNC: NEGATIVE MG/DL
LEUKOCYTE ESTERASE UR QL STRIP: NEGATIVE
LYMPHOCYTES # BLD AUTO: 1.53 THOUSANDS/ΜL (ref 0.6–4.47)
LYMPHOCYTES NFR BLD AUTO: 13 % (ref 14–44)
MCH RBC QN AUTO: 32.2 PG (ref 26.8–34.3)
MCHC RBC AUTO-ENTMCNC: 33.7 G/DL (ref 31.4–37.4)
MCV RBC AUTO: 96 FL (ref 82–98)
MONOCYTES # BLD AUTO: 0.57 THOUSAND/ΜL (ref 0.17–1.22)
MONOCYTES NFR BLD AUTO: 5 % (ref 4–12)
NEUTROPHILS # BLD AUTO: 9.37 THOUSANDS/ΜL (ref 1.85–7.62)
NEUTS SEG NFR BLD AUTO: 81 % (ref 43–75)
NITRITE UR QL STRIP: NEGATIVE
NON-SQ EPI CELLS URNS QL MICRO: ABNORMAL /HPF
NRBC BLD AUTO-RTO: 0 /100 WBCS
P AXIS: 21 DEGREES
PH UR STRIP.AUTO: 7.5 [PH] (ref 4.5–8)
PLATELET # BLD AUTO: 162 THOUSANDS/UL (ref 149–390)
PMV BLD AUTO: 12.7 FL (ref 8.9–12.7)
POTASSIUM SERPL-SCNC: 3.8 MMOL/L (ref 3.5–5.3)
PR INTERVAL: 128 MS
PROT UR STRIP-MCNC: NEGATIVE MG/DL
QRS AXIS: 93 DEGREES
QRSD INTERVAL: 86 MS
QT INTERVAL: 394 MS
QTC INTERVAL: 434 MS
RBC # BLD AUTO: 4.23 MILLION/UL (ref 3.81–5.12)
RBC #/AREA URNS AUTO: ABNORMAL /HPF
RH BLD: POSITIVE
SODIUM SERPL-SCNC: 141 MMOL/L (ref 136–145)
SP GR UR STRIP.AUTO: 1.01 (ref 1–1.03)
SPECIMEN EXPIRATION DATE: NORMAL
T WAVE AXIS: 75 DEGREES
UROBILINOGEN UR QL STRIP.AUTO: 0.2 E.U./DL
VENTRICULAR RATE: 73 BPM
WBC # BLD AUTO: 11.6 THOUSAND/UL (ref 4.31–10.16)
WBC #/AREA URNS AUTO: ABNORMAL /HPF

## 2021-03-29 PROCEDURE — 36415 COLL VENOUS BLD VENIPUNCTURE: CPT | Performed by: EMERGENCY MEDICINE

## 2021-03-29 PROCEDURE — 80048 BASIC METABOLIC PNL TOTAL CA: CPT | Performed by: EMERGENCY MEDICINE

## 2021-03-29 PROCEDURE — 81001 URINALYSIS AUTO W/SCOPE: CPT

## 2021-03-29 PROCEDURE — 99284 EMERGENCY DEPT VISIT MOD MDM: CPT | Performed by: EMERGENCY MEDICINE

## 2021-03-29 PROCEDURE — 76815 OB US LIMITED FETUS(S): CPT

## 2021-03-29 PROCEDURE — 86900 BLOOD TYPING SEROLOGIC ABO: CPT | Performed by: EMERGENCY MEDICINE

## 2021-03-29 PROCEDURE — 81025 URINE PREGNANCY TEST: CPT | Performed by: EMERGENCY MEDICINE

## 2021-03-29 PROCEDURE — 76815 OB US LIMITED FETUS(S): CPT | Performed by: EMERGENCY MEDICINE

## 2021-03-29 PROCEDURE — 93010 ELECTROCARDIOGRAM REPORT: CPT | Performed by: INTERNAL MEDICINE

## 2021-03-29 PROCEDURE — 86901 BLOOD TYPING SEROLOGIC RH(D): CPT | Performed by: EMERGENCY MEDICINE

## 2021-03-29 PROCEDURE — 99284 EMERGENCY DEPT VISIT MOD MDM: CPT

## 2021-03-29 PROCEDURE — 87086 URINE CULTURE/COLONY COUNT: CPT

## 2021-03-29 PROCEDURE — 93005 ELECTROCARDIOGRAM TRACING: CPT

## 2021-03-29 PROCEDURE — 86850 RBC ANTIBODY SCREEN: CPT | Performed by: EMERGENCY MEDICINE

## 2021-03-29 PROCEDURE — 85025 COMPLETE CBC W/AUTO DIFF WBC: CPT | Performed by: EMERGENCY MEDICINE

## 2021-03-29 PROCEDURE — 84702 CHORIONIC GONADOTROPIN TEST: CPT | Performed by: EMERGENCY MEDICINE

## 2021-03-29 RX ORDER — METOCLOPRAMIDE HYDROCHLORIDE 5 MG/ML
10 INJECTION INTRAMUSCULAR; INTRAVENOUS ONCE
Status: DISCONTINUED | OUTPATIENT
Start: 2021-03-29 | End: 2021-03-29

## 2021-03-29 NOTE — DISCHARGE INSTRUCTIONS
Hoy la atendieron en el Departamento de Emergencias por sangrado vaginal  Duran examen incluyó análisis de [place], análisis de [place] y ecografías  Curtis un seguimiento con duran [employer] de atención primaria en los próximos 1-2 días para volver a verificar kinsey síntomas y hacer un seguimiento de duran visita al Departamento de Emergencias hoy  Regrese al [employer] de Emergencias si tiene aturdimiento, fiebre, escalofríos, dolor en el pecho, dificultad para respirar, no puede comer o beber, o tiene cualquier otro síntoma que le preocupe  Por favor, carlos esto y avíseme a duran enfermera y / oa mí si tiene más preguntas antes de irse

## 2021-03-29 NOTE — ED PROVIDER NOTES
History  Chief Complaint   Patient presents with    Vaginal Bleeding - Pregnant     Pt reports vaginal bleeding, slightly heavier than a period, reports 5 weeks pregnant  Bert Araujo is an 19y o  year old  female, who presents to the ED today with vaginal bleeding for one day  States that she is 5 weeks pregnant based on LMP  LPM   Has not had a normal ultrasound  Has not established with an OBGYN  Endorses abdominal pain that is lower, cramping, and similar to her period pain  Endorses lightheadedness when she sees the blood but not at other times  The patient denies fevers, chills, headaches, syncope, nausea, vomiting, chest pain, shortness of breath, cough,  changes in usual bowel movements, changes with urination, back pain, pain anywhere else in body  The patient has no sick contacts, recent travel history, new or changing medications, or immunocompromise  Patient denies no use of drugs or alcohol  History provided by:  Medical records and patient   used: Yes    Vaginal Bleeding  Associated symptoms: no abdominal pain, no dizziness, no fatigue, no fever and no nausea        None       Past Medical History:   Diagnosis Date    Hypoglycemia        History reviewed  No pertinent surgical history  Family History   Problem Relation Age of Onset    Diabetes Maternal Grandfather      I have reviewed and agree with the history as documented  E-Cigarette/Vaping    E-Cigarette Use Never User      E-Cigarette/Vaping Substances    Nicotine No     THC No     CBD No     Flavoring No     Other No     Unknown No      Social History     Tobacco Use    Smoking status: Never Smoker    Smokeless tobacco: Never Used   Substance Use Topics    Alcohol use: Yes     Comment: socially    Drug use: No        Review of Systems   Constitutional: Negative for chills, fatigue and fever  HENT: Negative for rhinorrhea and sore throat      Eyes: Negative for visual disturbance  Respiratory: Negative for cough and shortness of breath  Cardiovascular: Negative for chest pain  Gastrointestinal: Negative for abdominal pain, diarrhea, nausea and vomiting  Genitourinary: Positive for vaginal bleeding  Negative for difficulty urinating  Musculoskeletal: Negative for arthralgias and myalgias  Neurological: Negative for dizziness, light-headedness and headaches  All other systems reviewed and are negative  Physical Exam  ED Triage Vitals   Temperature Pulse Respirations Blood Pressure SpO2   03/29/21 1349 03/29/21 1347 03/29/21 1347 03/29/21 1347 03/29/21 1347   98 4 °F (36 9 °C) 79 19 119/59 100 %      Temp Source Heart Rate Source Patient Position - Orthostatic VS BP Location FiO2 (%)   03/29/21 1349 03/29/21 1347 03/29/21 1347 03/29/21 1347 --   Oral Monitor Sitting Right arm       Pain Score       03/29/21 1347       3             Orthostatic Vital Signs  Vitals:    03/29/21 1347 03/29/21 1722   BP: 119/59 107/54   Pulse: 79 63   Patient Position - Orthostatic VS: Sitting        Physical Exam  Vitals signs and nursing note reviewed  Constitutional:       General: She is not in acute distress  Appearance: She is well-developed  She is not diaphoretic  HENT:      Head: Normocephalic and atraumatic  Eyes:      General: No scleral icterus  Conjunctiva/sclera: Conjunctivae normal    Neck:      Musculoskeletal: Neck supple  Vascular: No JVD  Trachea: No tracheal deviation  Cardiovascular:      Rate and Rhythm: Normal rate and regular rhythm  Pulmonary:      Effort: Pulmonary effort is normal  No respiratory distress  Abdominal:      Palpations: Abdomen is soft  Tenderness: There is no abdominal tenderness  There is no guarding  Musculoskeletal:         General: No deformity  Skin:     General: Skin is warm and dry  Findings: No rash  Neurological:      Mental Status: She is alert        Comments: Moves all extremities   Psychiatric:         Behavior: Behavior normal          ED Medications  Medications - No data to display    Diagnostic Studies  Results Reviewed     Procedure Component Value Units Date/Time    Urine Microscopic [[id number]]  (Abnormal) Collected: 03/29/21 1625    Lab Status: Final result Specimen: Urine, Clean Catch Updated: 03/29/21 1658     RBC, UA 0-1 /hpf      WBC, UA None Seen /hpf      Epithelial Cells Occasional /hpf      Bacteria, UA Occasional /hpf     Urine culture [[id number]] Collected: 03/29/21 1625    Lab Status:  In process Specimen: Urine, Clean Catch Updated: 03/29/21 1092    Basic metabolic panel [[id number]]  (Abnormal) Collected: 03/29/21 1551    Lab Status: Final result Specimen: Blood from Arm, Right Updated: 03/29/21 1637     Sodium 141 mmol/L      Potassium 3 8 mmol/L      Chloride 105 mmol/L      CO2 25 mmol/L      ANION GAP 11 mmol/L      BUN 12 mg/dL      Creatinine 0 55 mg/dL      Glucose 87 mg/dL      Calcium 9 2 mg/dL      eGFR 133 ml/min/1 73sq m     Narrative:      [place] guidelines for Chronic Kidney Disease (CKD):     Stage 1 with normal or high GFR (GFR > 90 mL/min/1 73 square meters)    Stage 2 Mild CKD (GFR = 60-89 mL/min/1 73 square meters)    Stage 3A Moderate CKD (GFR = 45-59 mL/min/1 73 square meters)    Stage 3B Moderate CKD (GFR = 30-44 mL/min/1 73 square meters)    Stage 4 Severe CKD (GFR = 15-29 mL/min/1 73 square meters)    Stage 5 End Stage CKD (GFR <15 mL/min/1 73 square meters)  Note: GFR calculation is accurate only with a steady state creatinine    hCG, quantitative [[id number]]  (Normal) Collected: 03/29/21 1551    Lab Status: Final result Specimen: Blood from Arm, Right Updated: 03/29/21 1637     HCG, Quant 5 1 mIU/mL     Narrative:       Expected Ranges:     Approximate               Approximate HCG  Gestation age          Concentration ( mIU/mL)  _____________          ______________________   [facility] HCG values  0 2-1                       5-50  1-2                           2-3                         100-5000  3-4                         500-68212  4-5                         1000-78207  5-6                         [id number]  6-8                         09211-256079  8-12                        23064-339597      POCT pregnancy, urine [[id number]]  (Normal) Resulted: 03/29/21 1635    Lab Status: Final result Updated: 03/29/21 1635     EXT PREG TEST UR (Ref: Negative) negative     Control valid    Urine Macroscopic, POC [[id number]]  (Abnormal) Collected: 03/29/21 1625    Lab Status: Final result Specimen: Urine Updated: 03/29/21 1627     Color, UA Yellow     Clarity, UA Clear     pH, UA 7 5     Leukocytes, UA Negative     Nitrite, UA Negative     Protein, UA Negative mg/dl      Glucose, UA Negative mg/dl      Ketones, UA Negative mg/dl      Urobilinogen, UA 0 2 E U /dl      Bilirubin, UA Negative     Blood, UA Large     Specific [place], UA 1 015    Narrative:      CLINITEK RESULT    CBC and differential [[id number]]  (Abnormal) Collected: 03/29/21 1551    Lab Status: Final result Specimen: Blood from Arm, Right Updated: 03/29/21 1558     WBC 11 60 Thousand/uL      RBC 4 23 Million/uL      Hemoglobin 13 6 g/dL      Hematocrit 40 4 %      MCV 96 fL      MCH 32 2 pg      MCHC 33 7 g/dL      RDW 11 9 %      MPV 12 7 fL      Platelets 710 Thousands/uL      nRBC 0 /100 WBCs      Neutrophils Relative 81 %      Immat GRANS % 1 %      Lymphocytes Relative 13 %      Monocytes Relative 5 %      Eosinophils Relative 0 %      Basophils Relative 0 %      Neutrophils Absolute 9 37 Thousands/µL      Immature Grans Absolute 0 07 Thousand/uL      Lymphocytes Absolute 1 53 Thousands/µL      Monocytes Absolute 0 57 Thousand/µL      Eosinophils Absolute 0 04 Thousand/µL      Basophils Absolute 0 02 Thousands/µL                  US OB pregnancy limited with transvaginal   Final Result by James Bunch MD (03/29 7803) 1   No intrauterine gestation or suspicious adnexal mass identified  Differential considerations include early IUP, spontaneous  and ectopic pregnancy  Recommend continued close clinical follow-up and serial serum beta hCG levels, with repeat    ultrasound in 7 days or earlier if clinically indicated  The study was marked in [facility] for immediate notification  Workstation performed: [id number]               Procedures  POC Pelvic US    Date/Time: 3/29/2021 4:15 PM  Performed by: Ceferino Adame DO  Authorized by: Ceferino Adame DO     Patient location:  ED  Other Assisting Provider: No    Procedure details:     Exam Type:  Diagnostic    Indications: evaluate for IUP and pregnant with vaginal bleeding      Views obtained: uterus (transverse and sagittal)      Image quality: limited diagnostic      Image availability:  Images available in PACS  Uterine findings:     Intrauterine pregnancy: not identified      Single gestation: not identified      Gestational sac: not identified      Yolk sac: not identified      Fetal pole: not identified    Interpretation:     Ultrasound impressions: indeterminate      Pregnancy findings: indeterminate            ED Course  ED Course as of Mar 29 1823   Mon Mar 29, 2021   1650 Procedure Note: EKG  Date/Time: 21 4:50 PM   Interpreted by: Ceferino Adame DO  Indications / Diagnosis: CP  ECG reviewed by me, the ED Provider: yes   The EKG demonstrates:  Rhythm: nsr, rate 73  Intervals: normal intervals  Axis: normal axis  QRS: normal QRS  ST Changes: No acute ST Changes, no STD/HEMAL  SBIRT 20yo+      Most Recent Value   SBIRT (22 yo +)   In order to provide better care to our patients, we are screening all of our patients for alcohol and drug use  Would it be okay to ask you these screening questions?   Unable to answer at this time Filed at: 2021 1656                MDM  Number of Diagnoses or Management Options  Diagnosis management comments: Pt presents with first trimester bleeding  Will to type and screen, formal US to r/o torsion  Rhogam if needed  Pt HD stable, VS normal, no acute distress  Not saturating 1+ pads per hour  No symptoms consistent with significant blood loss  Amount and/or Complexity of Data Reviewed  Clinical lab tests: ordered and reviewed  Tests in the radiology section of CPT®: reviewed and ordered  Tests in the medicine section of CPT®: ordered and reviewed  Review and summarize past medical records: yes  Discuss the patient with other providers: yes    Risk of Complications, Morbidity, and/or Mortality  Presenting problems: low  Diagnostic procedures: low  Management options: low    Patient Progress  Patient progress: stable      Disposition  Final diagnoses:   Vaginal bleeding     Time reflects when diagnosis was documented in both MDM as applicable and the Disposition within this note     Time User Action Codes Description Comment    3/29/2021  5:03 PM Kip Jaimes Add [N93 9] Vaginal bleeding       ED Disposition     ED Disposition Condition Date/Time Comment    Discharge Stable Mon Mar 29, 2021  5:03 PM Jessica Estrada discharge to home/self care  Results of completed tests discussed  Return to ER precautions given, verbal and written, and questions answered satisfactorily                  Follow-up Information     Follow up With Specialties Details Why Contact Info Additional Information    [facility]  Call in 1 day To get a primary care provider, if needed [facility] Call in 1 day For specialty evaluation and/or treatment [place] [phone] [address] [place] [id number]  [address] [place] [address], [id number] [phone]          There are no discharge medications for this patient  No discharge procedures on file  PDMP Review     None           ED Provider  Attending physically available and evaluated Jourdan Stapleton I managed the patient along with the ED Attending      Electronically Signed by         Taiwo Allen DO  03/29/21 3149

## 2021-03-29 NOTE — ED ATTENDING ATTESTATION
3/29/2021  IRebekah DO, saw and evaluated the patient  I have discussed the patient with the resident/non-physician practitioner and agree with the resident's/non-physician practitioner's findings, Plan of Care, and MDM as documented in the resident's/non-physician practitioner's note, except where noted  All available labs and Radiology studies were reviewed  I was present for key portions of any procedure(s) performed by the resident/non-physician practitioner and I was immediately available to provide assistance  At this point I agree with the current assessment done in the Emergency Department  I have conducted an independent evaluation of this patient a history and physical is as follows:    18 yo F  5-6 weeks presenting with 1 day of vaginal bleeding  Reports bleeding is about as heavy as typical menses  Was seen at urgent care on 3/24 for lightheadedness/syncope, urine test there was positive  Has appt with OB scheduled for Wednesday, has not seen them yet  No formal US yet      MDM: 18 yo F pregnant with vaginal bleeding- US to r/o ectopic/assess for IUP, hcg quant, blood type and rhogam as needed, CBC to r/o anemia      ED Course         Critical Care Time  Procedures

## 2021-03-30 LAB — BACTERIA UR CULT: NORMAL

## 2021-06-07 ENCOUNTER — HOSPITAL ENCOUNTER (EMERGENCY)
Facility: HOSPITAL | Age: 21
Discharge: HOME | End: 2021-06-07
Attending: EMERGENCY MEDICINE
Payer: COMMERCIAL

## 2021-06-07 ENCOUNTER — APPOINTMENT (EMERGENCY)
Dept: ULTRASOUND IMAGING | Facility: HOSPITAL | Age: 21
End: 2021-06-07
Payer: COMMERCIAL

## 2021-06-07 VITALS
DIASTOLIC BLOOD PRESSURE: 62 MMHG | TEMPERATURE: 97.8 F | OXYGEN SATURATION: 98 % | SYSTOLIC BLOOD PRESSURE: 117 MMHG | RESPIRATION RATE: 15 BRPM | HEART RATE: 92 BPM

## 2021-06-07 DIAGNOSIS — R10.30 LOWER ABDOMINAL PAIN: ICD-10-CM

## 2021-06-07 DIAGNOSIS — O36.80X0 PREGNANCY OF UNKNOWN ANATOMIC LOCATION: Primary | ICD-10-CM

## 2021-06-07 DIAGNOSIS — R10.9 ABDOMINAL PAIN IN PREGNANCY: ICD-10-CM

## 2021-06-07 DIAGNOSIS — O26.899 ABDOMINAL PAIN IN PREGNANCY: ICD-10-CM

## 2021-06-07 DIAGNOSIS — N83.201 RIGHT OVARIAN CYST: ICD-10-CM

## 2021-06-07 LAB
ALBUMIN SERPL BCP-MCNC: 4.3 G/DL (ref 3.5–5)
ALP SERPL-CCNC: 56 U/L (ref 46–116)
ALT SERPL W P-5'-P-CCNC: 14 U/L (ref 12–78)
ANION GAP SERPL CALCULATED.3IONS-SCNC: 11 MMOL/L (ref 4–13)
AST SERPL W P-5'-P-CCNC: 18 U/L (ref 5–45)
B-HCG SERPL-ACNC: 944.2 MIU/ML
BACTERIA UR QL AUTO: ABNORMAL /HPF
BASOPHILS # BLD AUTO: 0.01 THOUSANDS/ΜL (ref 0–0.1)
BASOPHILS NFR BLD AUTO: 0 % (ref 0–1)
BILIRUB SERPL-MCNC: 0.57 MG/DL (ref 0.2–1)
BILIRUB UR QL STRIP: NEGATIVE
BUN SERPL-MCNC: 11 MG/DL (ref 5–25)
CALCIUM SERPL-MCNC: 9.1 MG/DL (ref 8.3–10.1)
CHLORIDE SERPL-SCNC: 105 MMOL/L (ref 100–108)
CLARITY UR: CLEAR
CO2 SERPL-SCNC: 24 MMOL/L (ref 21–32)
COLOR UR: YELLOW
CREAT SERPL-MCNC: 0.58 MG/DL (ref 0.6–1.3)
EOSINOPHIL # BLD AUTO: 0.05 THOUSAND/ΜL (ref 0–0.61)
EOSINOPHIL NFR BLD AUTO: 1 % (ref 0–6)
ERYTHROCYTE [DISTWIDTH] IN BLOOD BY AUTOMATED COUNT: 11.7 % (ref 11.6–15.1)
EXT PREG TEST URINE: POSITIVE
EXT. CONTROL ED NAV: ABNORMAL
GFR SERPL CREATININE-BSD FRML MDRD: 130 ML/MIN/1.73SQ M
GLUCOSE SERPL-MCNC: 90 MG/DL (ref 65–140)
GLUCOSE UR STRIP-MCNC: NEGATIVE MG/DL
HCT VFR BLD AUTO: 39.7 % (ref 34.8–46.1)
HGB BLD-MCNC: 13.1 G/DL (ref 11.5–15.4)
HGB UR QL STRIP.AUTO: NEGATIVE
IMM GRANULOCYTES # BLD AUTO: 0.02 THOUSAND/UL (ref 0–0.2)
IMM GRANULOCYTES NFR BLD AUTO: 0 % (ref 0–2)
KETONES UR STRIP-MCNC: NEGATIVE MG/DL
LEUKOCYTE ESTERASE UR QL STRIP: ABNORMAL
LYMPHOCYTES # BLD AUTO: 1.69 THOUSANDS/ΜL (ref 0.6–4.47)
LYMPHOCYTES NFR BLD AUTO: 25 % (ref 14–44)
MCH RBC QN AUTO: 31 PG (ref 26.8–34.3)
MCHC RBC AUTO-ENTMCNC: 33 G/DL (ref 31.4–37.4)
MCV RBC AUTO: 94 FL (ref 82–98)
MONOCYTES # BLD AUTO: 0.39 THOUSAND/ΜL (ref 0.17–1.22)
MONOCYTES NFR BLD AUTO: 6 % (ref 4–12)
NEUTROPHILS # BLD AUTO: 4.68 THOUSANDS/ΜL (ref 1.85–7.62)
NEUTS SEG NFR BLD AUTO: 68 % (ref 43–75)
NITRITE UR QL STRIP: NEGATIVE
NON-SQ EPI CELLS URNS QL MICRO: ABNORMAL /HPF
NRBC BLD AUTO-RTO: 0 /100 WBCS
PH UR STRIP.AUTO: 5.5 [PH] (ref 4.5–8)
PLATELET # BLD AUTO: 162 THOUSANDS/UL (ref 149–390)
PMV BLD AUTO: 12.6 FL (ref 8.9–12.7)
POTASSIUM SERPL-SCNC: 3.8 MMOL/L (ref 3.5–5.3)
PROT SERPL-MCNC: 8.3 G/DL (ref 6.4–8.2)
PROT UR STRIP-MCNC: NEGATIVE MG/DL
RBC # BLD AUTO: 4.22 MILLION/UL (ref 3.81–5.12)
RBC #/AREA URNS AUTO: ABNORMAL /HPF
SODIUM SERPL-SCNC: 140 MMOL/L (ref 136–145)
SP GR UR STRIP.AUTO: >=1.03 (ref 1–1.03)
UROBILINOGEN UR QL STRIP.AUTO: 0.2 E.U./DL
WBC # BLD AUTO: 6.84 THOUSAND/UL (ref 4.31–10.16)
WBC #/AREA URNS AUTO: ABNORMAL /HPF

## 2021-06-07 PROCEDURE — 87086 URINE CULTURE/COLONY COUNT: CPT

## 2021-06-07 PROCEDURE — 76815 OB US LIMITED FETUS(S): CPT

## 2021-06-07 PROCEDURE — 99282 EMERGENCY DEPT VISIT SF MDM: CPT | Performed by: PHYSICIAN ASSISTANT

## 2021-06-07 PROCEDURE — 81025 URINE PREGNANCY TEST: CPT | Performed by: EMERGENCY MEDICINE

## 2021-06-07 PROCEDURE — 84702 CHORIONIC GONADOTROPIN TEST: CPT | Performed by: PHYSICIAN ASSISTANT

## 2021-06-07 PROCEDURE — 85025 COMPLETE CBC W/AUTO DIFF WBC: CPT | Performed by: PHYSICIAN ASSISTANT

## 2021-06-07 PROCEDURE — 81001 URINALYSIS AUTO W/SCOPE: CPT

## 2021-06-07 PROCEDURE — 99284 EMERGENCY DEPT VISIT MOD MDM: CPT

## 2021-06-07 PROCEDURE — 80053 COMPREHEN METABOLIC PANEL: CPT | Performed by: PHYSICIAN ASSISTANT

## 2021-06-07 PROCEDURE — 36415 COLL VENOUS BLD VENIPUNCTURE: CPT | Performed by: PHYSICIAN ASSISTANT

## 2021-06-07 NOTE — ED PROVIDER NOTES
History  Chief Complaint   Patient presents with    Abdominal Pain     Pt reports diffuse lower abdominal pain since this am  Pt reports positive pregnancy test on friday  pt reports last period was may 5  Pt denies vaginal bleeding      Patient is a 78-year-old female  5w2d based on LMP  who presents with lower abdominal pain for 1 day  Patient reports a spontaneous miscarriage in March or April of this year  She has had normal menstrual period since that time, last   She took a home pregnancy test yesterday, which was positive  This morning she started with a mild aching sensation across her lower abdomen  She denies any radiation of the pain, aggravating or alleviating factors, vaginal bleeding or discharge, dysuria, hematuria, urinary frequency or urgency, flank pain, history of ectopic pregnancies or ovarian cyst, nausea, vomiting, diarrhea, constipation  She has not taken anything to help alleviate her symptoms  Patient has not followed up with her OBGYN yet regarding this pregnancy  Patient states she is otherwise in her usual state of health and denies any fevers, chills, diaphoresis, headaches, congestion, cough, shortness of breath, chest pain, palpitations  Patient declines any pain medication at this time  None       Past Medical History:   Diagnosis Date    Hypoglycemia        History reviewed  No pertinent surgical history  Family History   Problem Relation Age of Onset    Diabetes Maternal Grandfather      I have reviewed and agree with the history as documented      E-Cigarette/Vaping    E-Cigarette Use Never User      E-Cigarette/Vaping Substances    Nicotine No     THC No     CBD No     Flavoring No     Other No     Unknown No      Social History     Tobacco Use    Smoking status: Never Smoker    Smokeless tobacco: Never Used   Substance Use Topics    Alcohol use: Yes     Comment: socially    Drug use: No       Review of Systems   Constitutional: Negative for chills, diaphoresis and fever  HENT: Negative for congestion, ear pain, rhinorrhea and sore throat  Respiratory: Negative for cough, shortness of breath, wheezing and stridor  Cardiovascular: Negative for chest pain and palpitations  Gastrointestinal: Positive for abdominal pain  Negative for diarrhea, nausea and vomiting  Genitourinary: Negative for difficulty urinating, dysuria, frequency, hematuria, urgency, vaginal bleeding and vaginal discharge  Skin: Negative for color change, pallor and rash  Neurological: Negative for dizziness, light-headedness and headaches  All other systems reviewed and are negative  Physical Exam  Physical Exam  Vitals signs and nursing note reviewed  Constitutional:       General: She is awake  She is not in acute distress  Appearance: She is well-developed  She is not ill-appearing, toxic-appearing or diaphoretic  HENT:      Head: Normocephalic and atraumatic  Right Ear: External ear normal       Left Ear: External ear normal       Nose: Nose normal    Eyes:      Conjunctiva/sclera: Conjunctivae normal       Pupils: Pupils are equal, round, and reactive to light  Neck:      Musculoskeletal: Normal range of motion and neck supple  Cardiovascular:      Rate and Rhythm: Normal rate and regular rhythm  Pulses: Normal pulses  Heart sounds: Normal heart sounds, S1 normal and S2 normal    Pulmonary:      Effort: Pulmonary effort is normal  No respiratory distress  Breath sounds: Normal breath sounds  No stridor  No decreased breath sounds or wheezing  Abdominal:      General: Bowel sounds are normal  There is no distension  Palpations: Abdomen is soft  Tenderness: There is abdominal tenderness in the right lower quadrant, suprapubic area and left lower quadrant  There is no right CVA tenderness, left CVA tenderness, guarding or rebound  Musculoskeletal: Normal range of motion        Comments: Moving all extremities freely, ambulating without issue   Skin:     General: Skin is warm and dry  Capillary Refill: Capillary refill takes less than 2 seconds  Neurological:      Mental Status: She is alert and oriented to person, place, and time  GCS: GCS eye subscore is 4  GCS verbal subscore is 5  GCS motor subscore is 6  Psychiatric:         Behavior: Behavior is cooperative           Vital Signs  ED Triage Vitals [06/07/21 1132]   Temperature Pulse Respirations Blood Pressure SpO2   97 8 °F (36 6 °C) 89 16 105/71 100 %      Temp Source Heart Rate Source Patient Position - Orthostatic VS BP Location FiO2 (%)   Oral Monitor Sitting Right arm --      Pain Score       --           Vitals:    06/07/21 1132 06/07/21 1309 06/07/21 1426   BP: 105/71 93/51 117/62   Pulse: 89 83 92   Patient Position - Orthostatic VS: Sitting           Visual Acuity      ED Medications  Medications - No data to display    Diagnostic Studies  Results Reviewed     Procedure Component Value Units Date/Time    hCG, quantitative [[id number]]  (Abnormal) Collected: 06/07/21 1307    Lab Status: Final result Specimen: Blood from Arm, Right Updated: 06/07/21 1338     HCG, Quant 944 2 mIU/mL     Narrative:       Expected Ranges:     Approximate               Approximate HCG  Gestation age          Concentration ( mIU/mL)  _____________          ______________________   Yohana Speaks                      HCG values  0 2-1                       5-50  1-2                           2-3                         100-5000  3-4                         500-85057  4-5                         1000-17661  5-6                         [id number]  6-8                         63106-226147  8-12                        [id number]      Comprehensive metabolic panel [[id number]]  (Abnormal) Collected: 06/07/21 1307    Lab Status: Final result Specimen: Blood from Arm, Right Updated: 06/07/21 1330     Sodium 140 mmol/L      Potassium 3 8 mmol/L      Chloride 105 mmol/L      CO2 24 mmol/L      ANION GAP 11 mmol/L      BUN 11 mg/dL      Creatinine 0 58 mg/dL      Glucose 90 mg/dL      Calcium 9 1 mg/dL      AST 18 U/L      ALT 14 U/L      Alkaline Phosphatase 56 U/L      Total Protein 8 3 g/dL      Albumin 4 3 g/dL      Total Bilirubin 0 57 mg/dL      eGFR 130 ml/min/1 73sq m     Narrative:      National Kidney Disease Foundation guidelines for Chronic Kidney Disease (CKD):     Stage 1 with normal or high GFR (GFR > 90 mL/min/1 73 square meters)    Stage 2 Mild CKD (GFR = 60-89 mL/min/1 73 square meters)    Stage 3A Moderate CKD (GFR = 45-59 mL/min/1 73 square meters)    Stage 3B Moderate CKD (GFR = 30-44 mL/min/1 73 square meters)    Stage 4 Severe CKD (GFR = 15-29 mL/min/1 73 square meters)    Stage 5 End Stage CKD (GFR <15 mL/min/1 73 square meters)  Note: GFR calculation is accurate only with a steady state creatinine    Urine Microscopic [[id number]]  (Abnormal) Collected: 06/07/21 1154    Lab Status: Final result Specimen: Urine, Clean Catch Updated: 06/07/21 1318     RBC, UA 0-1 /hpf      WBC, UA 1-2 /hpf      Epithelial Cells Occasional /hpf      Bacteria, UA Occasional /hpf     CBC and differential [[id number]] Collected: 06/07/21 1307    Lab Status: Final result Specimen: Blood from Arm, Right Updated: 06/07/21 1313     WBC 6 84 Thousand/uL      RBC 4 22 Million/uL      Hemoglobin 13 1 g/dL      Hematocrit 39 7 %      MCV 94 fL      MCH 31 0 pg      MCHC 33 0 g/dL      RDW 11 7 %      MPV 12 6 fL      Platelets 020 Thousands/uL      nRBC 0 /100 WBCs      Neutrophils Relative 68 %      Immat GRANS % 0 %      Lymphocytes Relative 25 %      Monocytes Relative 6 %      Eosinophils Relative 1 %      Basophils Relative 0 %      Neutrophils Absolute 4 68 Thousands/µL      Immature Grans Absolute 0 02 Thousand/uL      Lymphocytes Absolute 1 69 Thousands/µL      Monocytes Absolute 0 39 Thousand/µL      Eosinophils Absolute 0 05 Thousand/µL      Basophils Absolute 0 01 Thousands/µL     POCT pregnancy, urine [[id number]]  (Abnormal) Resulted: 06/07/21 1202    Lab Status: Final result Updated: 06/07/21 1202     EXT PREG TEST UR (Ref: Negative) positive     Control valid    Urine culture [[id number]] Collected: 06/07/21 1154    Lab Status: In process Specimen: Urine, Clean Catch Updated: 06/07/21 1158    Urine Macroscopic, POC [[id number]]  (Abnormal) Collected: 06/07/21 1154    Lab Status: Final result Specimen: Urine Updated: 06/07/21 1156     Color, UA Yellow     Clarity, UA Clear     pH, UA 5 5     Leukocytes, UA Small     Nitrite, UA Negative     Protein, UA Negative mg/dl      Glucose, UA Negative mg/dl      Ketones, UA Negative mg/dl      Urobilinogen, UA 0 2 E U /dl      Bilirubin, UA Negative     Blood, UA Negative     Specific Gravity, UA >=1 030    Narrative:      CLINITEK RESULT                 US OB pregnancy limited with transvaginal   Final Result by Sultana Doherty MD (06/07 1339)   Mildly thickened endometrium  Tiny cysts in the endometrium which may be secondary to endometrial hyperplasia, but cannot exclude early pregnancy  Correlate clinically with serial beta hCG and follow-up sonogram in 7-10 days  Probable benign hemorrhagic right ovarian cyst   No specific findings of ectopic pregnancy  This could also be reimaged in 7-10 days to ensure resolution or decreased size  Workstation performed: [id number]                    Procedures  Procedures         ED Course  ED Course as of Jun 07 1526 Mon Jun 07, 2021   1211 PREGNANCY TEST URINE: positive   1211 Leukocytes, UA(!): Small   1211 Nitrite, UA: Negative   1211 Blood, UA: Negative   1339 HCG QUANTITATIVE(!): 944 2   1404 IMPRESSION:  Mildly thickened endometrium  Tiny cysts in the endometrium which may be secondary to endometrial hyperplasia, but cannot exclude early pregnancy    Correlate clinically with serial beta hCG and follow-up sonogram in 7-10 days      Probable benign hemorrhagic right ovarian cyst   No specific findings of ectopic pregnancy  This could also be reimaged in 7-10 days to ensure resolution or decreased size  US OB pregnancy limited with transvaginal   1430 Reviewed all results with patient, answered questions  Patient is stable for discharge  SBIRT 20yo+      Most Recent Value   SBIRT (24 yo +)   In order to provide better care to our patients, we are screening all of our patients for alcohol and drug use  Would it be okay to ask you these screening questions? Yes Filed at: 06/07/2021 1146   Initial Alcohol Screen: US AUDIT-C    1  How often do you have a drink containing alcohol?  0 Filed at: 06/07/2021 1146   2  How many drinks containing alcohol do you have on a typical day you are drinking? 0 Filed at: 06/07/2021 1146   3b  FEMALE Any Age, or MALE 65+: How often do you have 4 or more drinks on one occassion? 0 Filed at: 06/07/2021 1146   Audit-C Score  0 Filed at: 06/07/2021 1146   TABBY: How many times in the past year have you    Used an illegal drug or used a prescription medication for non-medical reasons? Never Filed at: 06/07/2021 1146                    MDM  Number of Diagnoses or Management Options  Abdominal pain in pregnancy:   Pregnancy of unknown anatomic location:   Right ovarian cyst:   Diagnosis management comments: Reviewed all results with patient, answered questions, including appropriate education and precautions  Reviewed treatment at home  Patient instructed complete blood work in 2 days  Recommended follow-up with OBGYN as soon as possible for further evaluation and monitoring of symptoms, as well as completion of ultrasound in 1 week  The management plan was discussed in detail with the patient at bedside and all questions were answered  Provided both verbal and written instructions  Reviewed red flag symptoms and strict return to ED instructions  Patient notes understanding and agrees to plan    Patient was offered [place] translation services, but she declined  She noted understanding and [place]  Disposition  Final diagnoses:   Pregnancy of unknown anatomic location   Abdominal pain in pregnancy   Right ovarian cyst     Time reflects when diagnosis was documented in both MDM as applicable and the Disposition within this note     Time User Action Codes Description Comment    6/7/2021  1:07 PM Alexander Manning Add [R10 30] Lower abdominal pain     6/7/2021  1:39 PM Mike Outhouse Modify [R10 30] Lower abdominal pain     6/7/2021  2:42 PM CostAly tranen Gip Modify [R10 30] Lower abdominal pain     6/7/2021  2:42 PM [place], [address] Pregnancy of unknown anatomic location     6/7/2021  2:42 PM CostlowEmerita Gip Add [O26 899,  R10 9] Abdominal pain in pregnancy     6/7/2021  2:43 PM Jerri Tapia Add [N30 150] Right ovarian cyst       ED Disposition     ED Disposition Condition Date/Time Comment    Discharge Stable Mon Jun 7, 2021  2:42 PM Debra Hernandez discharge to home/self care  Follow-up Information     Follow up With Specialties Details Why Contact Info Additional [address] [facility] Emergency Department Emergency Medicine  If symptoms worsen [facility] [id number]  [address] [facility] Emergency Department, [address] [facility] [place]  , [place], [address] [facility], [address] Obstetrics and Gynecology Schedule an appointment as soon as possible for a visit   [address], [address] [address]  hospitals, [address], [address] [place] [address], [address] [phone]          There are no discharge medications for this patient  Outpatient Discharge Orders   hCG, quantitative   Standing Status: Future Standing Exp   Date: 06/07/22       PDMP Review     None          ED Provider  Electronically Signed by           Betsy Kenyon THUY  06/07/21 1527

## 2021-06-07 NOTE — ED NOTES
Blood work still needs to be collected as pt was taken for ultrasound before staff could even attempt to draw labs        Junior King RN  06/07/21 [id number]

## 2021-06-07 NOTE — DISCHARGE INSTRUCTIONS
Tylenol at home as needed for pain  Complete blood work in 2 days  Follow-up with OBGYN for further evaluation and monitoring of symptoms  Return to ED if symptoms worsen including increasing pain, vaginal bleeding, fevers, inability to tolerate food or fluid, pain with urination, blood in urine

## 2021-06-08 LAB — BACTERIA UR CULT: NORMAL

## 2021-06-11 ENCOUNTER — APPOINTMENT (OUTPATIENT)
Dept: LAB | Facility: HOSPITAL | Age: 21
End: 2021-06-11
Payer: COMMERCIAL

## 2021-06-11 DIAGNOSIS — R10.9 ABDOMINAL PAIN IN PREGNANCY: ICD-10-CM

## 2021-06-11 DIAGNOSIS — O26.899 ABDOMINAL PAIN IN PREGNANCY: ICD-10-CM

## 2021-06-11 LAB — B-HCG SERPL-ACNC: 4131.2 MIU/ML

## 2021-06-11 PROCEDURE — 84702 CHORIONIC GONADOTROPIN TEST: CPT

## 2021-06-11 PROCEDURE — 36415 COLL VENOUS BLD VENIPUNCTURE: CPT
